# Patient Record
Sex: MALE | Race: WHITE | NOT HISPANIC OR LATINO | URBAN - METROPOLITAN AREA
[De-identification: names, ages, dates, MRNs, and addresses within clinical notes are randomized per-mention and may not be internally consistent; named-entity substitution may affect disease eponyms.]

---

## 2018-01-07 ENCOUNTER — EMERGENCY (EMERGENCY)
Facility: HOSPITAL | Age: 28
LOS: 1 days | Discharge: HOME | End: 2018-01-07
Admitting: EMERGENCY MEDICINE
Payer: COMMERCIAL

## 2018-01-07 VITALS
OXYGEN SATURATION: 99 % | HEART RATE: 88 BPM | SYSTOLIC BLOOD PRESSURE: 120 MMHG | RESPIRATION RATE: 18 BRPM | DIASTOLIC BLOOD PRESSURE: 75 MMHG

## 2018-01-07 VITALS
OXYGEN SATURATION: 99 % | TEMPERATURE: 98 F | RESPIRATION RATE: 18 BRPM | HEART RATE: 98 BPM | SYSTOLIC BLOOD PRESSURE: 127 MMHG | DIASTOLIC BLOOD PRESSURE: 77 MMHG

## 2018-01-07 DIAGNOSIS — Z23 ENCOUNTER FOR IMMUNIZATION: ICD-10-CM
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
## 2018-01-07 DIAGNOSIS — S00.81XA ABRASION OF OTHER PART OF HEAD, INITIAL ENCOUNTER: ICD-10-CM
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
## 2018-01-07 DIAGNOSIS — F17.200 NICOTINE DEPENDENCE, UNSPECIFIED, UNCOMPLICATED: ICD-10-CM

## 2018-01-07 DIAGNOSIS — S60.221A CONTUSION OF RIGHT HAND, INITIAL ENCOUNTER: ICD-10-CM

## 2018-01-07 DIAGNOSIS — F10.10 ALCOHOL ABUSE, UNCOMPLICATED: ICD-10-CM
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
## 2018-01-07 PROCEDURE — 90715 TDAP VACCINE 7 YRS/> IM: CPT

## 2018-01-07 PROCEDURE — 70486 CT MAXILLOFACIAL W/O DYE: CPT | Mod: 26

## 2018-01-07 PROCEDURE — 70486 CT MAXILLOFACIAL W/O DYE: CPT

## 2018-01-07 PROCEDURE — 73130 X-RAY EXAM OF HAND: CPT

## 2018-01-07 PROCEDURE — 73130 X-RAY EXAM OF HAND: CPT | Mod: 26,RT

## 2018-01-07 PROCEDURE — 90471 IMMUNIZATION ADMIN: CPT

## 2018-01-07 PROCEDURE — 70450 CT HEAD/BRAIN W/O DYE: CPT | Mod: 26

## 2018-01-07 PROCEDURE — 99285 EMERGENCY DEPT VISIT HI MDM: CPT | Mod: 25

## 2018-01-07 PROCEDURE — 99284 EMERGENCY DEPT VISIT MOD MDM: CPT | Mod: 25

## 2018-01-07 PROCEDURE — 70450 CT HEAD/BRAIN W/O DYE: CPT

## 2018-01-07 RX ORDER — ACETAMINOPHEN 500 MG
975 TABLET ORAL ONCE
Qty: 0 | Refills: 0 | Status: COMPLETED | OUTPATIENT
Start: 2018-01-07 | End: 2018-01-07

## 2018-01-07 RX ORDER — TETANUS TOXOID, REDUCED DIPHTHERIA TOXOID AND ACELLULAR PERTUSSIS VACCINE, ADSORBED 5; 2.5; 8; 8; 2.5 [IU]/.5ML; [IU]/.5ML; UG/.5ML; UG/.5ML; UG/.5ML
0.5 SUSPENSION INTRAMUSCULAR ONCE
Qty: 0 | Refills: 0 | Status: COMPLETED | OUTPATIENT
Start: 2018-01-07 | End: 2018-01-07

## 2018-01-07 RX ADMIN — Medication 975 MILLIGRAM(S): at 05:33

## 2018-01-07 RX ADMIN — TETANUS TOXOID, REDUCED DIPHTHERIA TOXOID AND ACELLULAR PERTUSSIS VACCINE, ADSORBED 0.5 MILLILITER(S): 5; 2.5; 8; 8; 2.5 SUSPENSION INTRAMUSCULAR at 05:33

## 2018-01-07 NOTE — ED PROVIDER NOTE - OBJECTIVE STATEMENT
28 yo m with no pmh c/o abrasion to L side of face s/p trip and fall. Pt admits to drinking 2 whisky sours and tripped. Pt states he hit his hands and then his face. Denies LOC. Reports pain to L side of face. Denies HA, dizziness, neck pain, n/v. Unknown last tetanus.

## 2018-01-07 NOTE — ED ADULT NURSE NOTE - CHPI ED SYMPTOMS NEG
no decreased eating/drinking/no nausea/no vomiting/no dizziness/no fever/no chills/no numbness/no tingling/no weakness

## 2018-01-07 NOTE — ED PROVIDER NOTE - PHYSICAL EXAMINATION
CONSTITUTIONAL: Well-appearing; well-nourished; in no apparent distress.   HEAD: Normocephalic; +abrasion and swelling to L cheek, +tenderness to L zygomatic bone.  EYES: PERRL; EOM intact; conjunctiva and sclera clear  ENT: normal nose; no rhinorrhea; normal pharynx with no erythema or lesions. No dental injury, no trismus   NECK: Supple; non-tender; no LAD. No cervical spinal tenderness   CARDIOVASCULAR: Normal S1, S2; no murmurs, rubs, or gallops. Regular rate and rhythm.   RESPIRATORY: Breathing easily; breath sounds clear and equal bilaterally; no wheezes, rhonchi, or rales.  GI: Soft; non-distended; non-tender; no palpable organomegaly.   MSK: FROM at all extremities, normal tone. R hand- +ecchymosis to base of 1st MCP with tenderness, no snuffbox tenderness, FROM,  strength 5/5   EXT: No cyanosis or edema; N/V intact  SKIN: Normal for age and race; warm; dry; good turgor; no apparent lesions or rash.   NEURO: A & O x 3; face symmetric; grossly unremarkable.   PSYCHOLOGICAL: The patient’s mood and manner are appropriate. CONSTITUTIONAL: Well-appearing; well-nourished; in no apparent distress.   HEAD: Normocephalic; +abrasion and swelling to L cheek, +tenderness to L zygomatic bone.  EYES: PERRL; EOM intact; conjunctiva and sclera clear  ENT: normal nose; no rhinorrhea; normal pharynx with no erythema or lesions. No dental injury, no trismus   NECK: Supple; non-tender; no LAD. No cervical spinal tenderness   CARDIOVASCULAR: Normal S1, S2; no murmurs, rubs, or gallops. Regular rate and rhythm.   RESPIRATORY: Breathing easily; breath sounds clear and equal bilaterally; no wheezes, rhonchi, or rales.  GI: Soft; non-distended; non-tender; no palpable organomegaly.   MSK: FROM at all extremities, normal tone. R hand- +ecchymosis to base of 1st MCP with tenderness, no snuffbox tenderness, FROM,  strength 5/5   EXT: No cyanosis or edema; N/V intact  SKIN: Normal for age and race; warm; dry; good turgor; no apparent lesions or rash.   NEURO: A & O x 3; face symmetric; grossly unremarkable. Gait steady   PSYCHOLOGICAL: The patient’s mood and manner are appropriate.

## 2018-01-07 NOTE — ED PROVIDER NOTE - LAB INTERPRETATION
ER PA: Luisa Ragland  hand xray: INTERPRETATION:  no acute fracture; no soft tissue swelling noted; normal bony alignment.

## 2018-01-07 NOTE — ED ADULT TRIAGE NOTE - ARRIVAL INFO ADDITIONAL COMMENTS
Pt walked into Ed with c/o of left side cheek abrasion with swelling to the right side of face. Pt had 2 drinks and is walking with a steady gait, speaking full sentences and tripped on ice. Fell forward and broke the fall with his hands but hit his face. Pt denies LOC, visual changes, HA, fever, chills, pain. Pt is unaware when he received his last tetanus shot. Denies medical HX and taking blood thinners Pt walked into Ed with c/o of left side cheek abrasion with swelling to the right side of face. Pt had 2 drinks and is walking with a steady gait, speaking full sentences and tripped on ice. Fell forward and broke the fall with his hands but hit his face. Pt has bruise to the left upper hand. Pt denies LOC, visual changes, HA, fever, chills, pain. Pt is unaware when he received his last tetanus shot. Denies medical HX and taking blood thinners

## 2018-01-07 NOTE — ED PROVIDER NOTE - MEDICAL DECISION MAKING DETAILS
26 yo m with no pmh c/o abrasion to L side of face s/p trip and fall. +etoh use. Tenderness and abrasion to L cheek, no dental injury. No cervical spinal tenderness. Neurologically intact. +ecchymosis and tenderness to R hand. r/o fracture, r/o intracranial bleed. Update tetanus. 26 yo m with no pmh c/o abrasion to L side of face s/p trip and fall. +etoh use. Tenderness and abrasion to L cheek, no dental injury. No cervical spinal tenderness. Neurologically intact. Gait steady.  +ecchymosis and tenderness to R hand. r/o fracture, r/o intracranial bleed. Update tetanus. 28 yo m with no pmh c/o abrasion to L side of face s/p trip and fall. +etoh use. Tenderness and abrasion to L cheek, no dental injury. No cervical spinal tenderness. Neurologically intact. Gait steady.  +ecchymosis and tenderness to R hand. CT head and facial bones neg for fracture or bleed. +soft tissue swelling. Tetanus updated.

## 2018-01-07 NOTE — ED ADULT NURSE NOTE - OBJECTIVE STATEMENT
pt presents to ED s/p fall. Pt states she caught himself with his hands and then hit his left cheek on ground. pt presents with abrasion to swelling to left cheek. pt denies loc, n/v, dizziness, vision changes. pt also admits to drinking 2 whiskey sours. gait steady

## 2018-01-07 NOTE — ED PROVIDER NOTE - CARE PLAN
Principal Discharge DX:	Abrasion of face, initial encounter  Secondary Diagnosis:	Contusion of right hand, initial encounter

## 2019-04-01 NOTE — ED ADULT NURSE NOTE - CAS TRG GEN SKIN COLOR
[address] [address]  [phone]      April 1, 2019      Denise Sosa was evaluated in Urgent Care today.  Please excuse him from school 04/01/19.        SIGNATURE: ___________________________________________      Latricia Early PA-C        
Normal for race